# Patient Record
Sex: FEMALE | Race: WHITE | NOT HISPANIC OR LATINO | URBAN - METROPOLITAN AREA
[De-identification: names, ages, dates, MRNs, and addresses within clinical notes are randomized per-mention and may not be internally consistent; named-entity substitution may affect disease eponyms.]

---

## 2020-01-04 ENCOUNTER — INPATIENT (INPATIENT)
Facility: HOSPITAL | Age: 25
LOS: 2 days | Discharge: HOME | DRG: 316 | End: 2020-01-07
Attending: HOSPITALIST | Admitting: HOSPITALIST
Payer: COMMERCIAL

## 2020-01-04 VITALS
RESPIRATION RATE: 17 BRPM | HEART RATE: 98 BPM | HEIGHT: 64 IN | OXYGEN SATURATION: 98 % | DIASTOLIC BLOOD PRESSURE: 69 MMHG | TEMPERATURE: 98 F | SYSTOLIC BLOOD PRESSURE: 100 MMHG | WEIGHT: 134.48 LBS

## 2020-01-04 DIAGNOSIS — J02.0 STREPTOCOCCAL PHARYNGITIS: ICD-10-CM

## 2020-01-04 DIAGNOSIS — F90.9 ATTENTION-DEFICIT HYPERACTIVITY DISORDER, UNSPECIFIED TYPE: ICD-10-CM

## 2020-01-04 DIAGNOSIS — I30.9 ACUTE PERICARDITIS, UNSPECIFIED: ICD-10-CM

## 2020-01-04 DIAGNOSIS — R63.8 OTHER SYMPTOMS AND SIGNS CONCERNING FOOD AND FLUID INTAKE: ICD-10-CM

## 2020-01-04 LAB
ALBUMIN SERPL ELPH-MCNC: 3.8 G/DL — SIGNIFICANT CHANGE UP (ref 3.3–5)
ALP SERPL-CCNC: 71 U/L — SIGNIFICANT CHANGE UP (ref 40–120)
ALT FLD-CCNC: 17 U/L — SIGNIFICANT CHANGE UP (ref 10–45)
ANION GAP SERPL CALC-SCNC: 11 MMOL/L — SIGNIFICANT CHANGE UP (ref 5–17)
AST SERPL-CCNC: 60 U/L — HIGH (ref 10–40)
BASOPHILS # BLD AUTO: 0.04 K/UL — SIGNIFICANT CHANGE UP (ref 0–0.2)
BASOPHILS NFR BLD AUTO: 0.5 % — SIGNIFICANT CHANGE UP (ref 0–2)
BILIRUB SERPL-MCNC: 0.3 MG/DL — SIGNIFICANT CHANGE UP (ref 0.2–1.2)
BUN SERPL-MCNC: 9 MG/DL — SIGNIFICANT CHANGE UP (ref 7–23)
CALCIUM SERPL-MCNC: 8.8 MG/DL — SIGNIFICANT CHANGE UP (ref 8.4–10.5)
CHLORIDE SERPL-SCNC: 103 MMOL/L — SIGNIFICANT CHANGE UP (ref 96–108)
CK MB CFR SERPL CALC: 55.5 NG/ML — HIGH (ref 0–6.7)
CK SERPL-CCNC: 512 U/L — HIGH (ref 25–170)
CO2 SERPL-SCNC: 24 MMOL/L — SIGNIFICANT CHANGE UP (ref 22–31)
CREAT SERPL-MCNC: 0.62 MG/DL — SIGNIFICANT CHANGE UP (ref 0.5–1.3)
D DIMER BLD IA.RAPID-MCNC: 280 NG/ML DDU — HIGH
EOSINOPHIL # BLD AUTO: 0.08 K/UL — SIGNIFICANT CHANGE UP (ref 0–0.5)
EOSINOPHIL NFR BLD AUTO: 0.9 % — SIGNIFICANT CHANGE UP (ref 0–6)
FLU A RESULT: SIGNIFICANT CHANGE UP
FLU A RESULT: SIGNIFICANT CHANGE UP
FLUAV AG NPH QL: SIGNIFICANT CHANGE UP
FLUBV AG NPH QL: SIGNIFICANT CHANGE UP
GLUCOSE SERPL-MCNC: 165 MG/DL — HIGH (ref 70–99)
HCT VFR BLD CALC: 38.6 % — SIGNIFICANT CHANGE UP (ref 34.5–45)
HGB BLD-MCNC: 13.1 G/DL — SIGNIFICANT CHANGE UP (ref 11.5–15.5)
HIV 1+2 AB+HIV1 P24 AG SERPL QL IA: SIGNIFICANT CHANGE UP
IMM GRANULOCYTES NFR BLD AUTO: 0.2 % — SIGNIFICANT CHANGE UP (ref 0–1.5)
LYMPHOCYTES # BLD AUTO: 1.29 K/UL — SIGNIFICANT CHANGE UP (ref 1–3.3)
LYMPHOCYTES # BLD AUTO: 14.6 % — SIGNIFICANT CHANGE UP (ref 13–44)
MCHC RBC-ENTMCNC: 29.8 PG — SIGNIFICANT CHANGE UP (ref 27–34)
MCHC RBC-ENTMCNC: 33.9 GM/DL — SIGNIFICANT CHANGE UP (ref 32–36)
MCV RBC AUTO: 87.7 FL — SIGNIFICANT CHANGE UP (ref 80–100)
MONOCYTES # BLD AUTO: 0.39 K/UL — SIGNIFICANT CHANGE UP (ref 0–0.9)
MONOCYTES NFR BLD AUTO: 4.4 % — SIGNIFICANT CHANGE UP (ref 2–14)
NEUTROPHILS # BLD AUTO: 7.02 K/UL — SIGNIFICANT CHANGE UP (ref 1.8–7.4)
NEUTROPHILS NFR BLD AUTO: 79.4 % — HIGH (ref 43–77)
NRBC # BLD: 0 /100 WBCS — SIGNIFICANT CHANGE UP (ref 0–0)
PCP SPEC-MCNC: SIGNIFICANT CHANGE UP
PLATELET # BLD AUTO: 212 K/UL — SIGNIFICANT CHANGE UP (ref 150–400)
POTASSIUM SERPL-MCNC: 4.1 MMOL/L — SIGNIFICANT CHANGE UP (ref 3.5–5.3)
POTASSIUM SERPL-SCNC: 4.1 MMOL/L — SIGNIFICANT CHANGE UP (ref 3.5–5.3)
PROT SERPL-MCNC: 6.9 G/DL — SIGNIFICANT CHANGE UP (ref 6–8.3)
RBC # BLD: 4.4 M/UL — SIGNIFICANT CHANGE UP (ref 3.8–5.2)
RBC # FLD: 12.9 % — SIGNIFICANT CHANGE UP (ref 10.3–14.5)
RSV RESULT: SIGNIFICANT CHANGE UP
RSV RNA RESP QL NAA+PROBE: SIGNIFICANT CHANGE UP
SODIUM SERPL-SCNC: 138 MMOL/L — SIGNIFICANT CHANGE UP (ref 135–145)
TROPONIN T SERPL-MCNC: 0.73 NG/ML — CRITICAL HIGH (ref 0–0.01)
TROPONIN T SERPL-MCNC: 0.81 NG/ML — CRITICAL HIGH (ref 0–0.01)
TROPONIN T SERPL-MCNC: 0.83 NG/ML — CRITICAL HIGH (ref 0–0.01)
TSH SERPL-MCNC: 2.02 UIU/ML — SIGNIFICANT CHANGE UP (ref 0.35–4.94)
WBC # BLD: 8.84 K/UL — SIGNIFICANT CHANGE UP (ref 3.8–10.5)
WBC # FLD AUTO: 8.84 K/UL — SIGNIFICANT CHANGE UP (ref 3.8–10.5)

## 2020-01-04 PROCEDURE — 71275 CT ANGIOGRAPHY CHEST: CPT | Mod: 26

## 2020-01-04 PROCEDURE — 93306 TTE W/DOPPLER COMPLETE: CPT | Mod: 26

## 2020-01-04 PROCEDURE — 71046 X-RAY EXAM CHEST 2 VIEWS: CPT | Mod: 26

## 2020-01-04 PROCEDURE — 93010 ELECTROCARDIOGRAM REPORT: CPT

## 2020-01-04 PROCEDURE — 99285 EMERGENCY DEPT VISIT HI MDM: CPT

## 2020-01-04 RX ORDER — IBUPROFEN 200 MG
600 TABLET ORAL THREE TIMES A DAY
Refills: 0 | Status: DISCONTINUED | OUTPATIENT
Start: 2020-01-04 | End: 2020-01-07

## 2020-01-04 RX ORDER — COLCHICINE 0.6 MG
0.6 TABLET ORAL ONCE
Refills: 0 | Status: COMPLETED | OUTPATIENT
Start: 2020-01-04 | End: 2020-01-04

## 2020-01-04 RX ORDER — SODIUM CHLORIDE 9 MG/ML
500 INJECTION INTRAMUSCULAR; INTRAVENOUS; SUBCUTANEOUS ONCE
Refills: 0 | Status: COMPLETED | OUTPATIENT
Start: 2020-01-04 | End: 2020-01-04

## 2020-01-04 RX ORDER — ALPRAZOLAM 0.25 MG
0.25 TABLET ORAL ONCE
Refills: 0 | Status: DISCONTINUED | OUTPATIENT
Start: 2020-01-04 | End: 2020-01-04

## 2020-01-04 RX ORDER — AMOXICILLIN 250 MG/5ML
875 SUSPENSION, RECONSTITUTED, ORAL (ML) ORAL
Refills: 0 | Status: DISCONTINUED | OUTPATIENT
Start: 2020-01-04 | End: 2020-01-07

## 2020-01-04 RX ORDER — SODIUM CHLORIDE 9 MG/ML
1000 INJECTION INTRAMUSCULAR; INTRAVENOUS; SUBCUTANEOUS
Refills: 0 | Status: DISCONTINUED | OUTPATIENT
Start: 2020-01-04 | End: 2020-01-07

## 2020-01-04 RX ORDER — PANTOPRAZOLE SODIUM 20 MG/1
40 TABLET, DELAYED RELEASE ORAL ONCE
Refills: 0 | Status: COMPLETED | OUTPATIENT
Start: 2020-01-04 | End: 2020-01-04

## 2020-01-04 RX ORDER — ACETAMINOPHEN 500 MG
650 TABLET ORAL ONCE
Refills: 0 | Status: COMPLETED | OUTPATIENT
Start: 2020-01-04 | End: 2020-01-04

## 2020-01-04 RX ORDER — IPRATROPIUM/ALBUTEROL SULFATE 18-103MCG
3 AEROSOL WITH ADAPTER (GRAM) INHALATION ONCE
Refills: 0 | Status: COMPLETED | OUTPATIENT
Start: 2020-01-04 | End: 2020-01-04

## 2020-01-04 RX ORDER — PANTOPRAZOLE SODIUM 20 MG/1
40 TABLET, DELAYED RELEASE ORAL
Refills: 0 | Status: DISCONTINUED | OUTPATIENT
Start: 2020-01-05 | End: 2020-01-07

## 2020-01-04 RX ORDER — KETOROLAC TROMETHAMINE 30 MG/ML
15 SYRINGE (ML) INJECTION ONCE
Refills: 0 | Status: DISCONTINUED | OUTPATIENT
Start: 2020-01-04 | End: 2020-01-04

## 2020-01-04 RX ADMIN — SODIUM CHLORIDE 50 MILLILITER(S): 9 INJECTION INTRAMUSCULAR; INTRAVENOUS; SUBCUTANEOUS at 21:22

## 2020-01-04 RX ADMIN — Medication 600 MILLIGRAM(S): at 22:24

## 2020-01-04 RX ADMIN — Medication 15 MILLIGRAM(S): at 12:55

## 2020-01-04 RX ADMIN — SODIUM CHLORIDE 1000 MILLILITER(S): 9 INJECTION INTRAMUSCULAR; INTRAVENOUS; SUBCUTANEOUS at 21:24

## 2020-01-04 RX ADMIN — Medication 3 MILLILITER(S): at 08:49

## 2020-01-04 RX ADMIN — Medication 875 MILLIGRAM(S): at 20:24

## 2020-01-04 RX ADMIN — Medication 0.6 MILLIGRAM(S): at 13:17

## 2020-01-04 RX ADMIN — Medication 15 MILLIGRAM(S): at 08:46

## 2020-01-04 RX ADMIN — Medication 0.25 MILLIGRAM(S): at 20:21

## 2020-01-04 RX ADMIN — Medication 0.6 MILLIGRAM(S): at 17:51

## 2020-01-04 RX ADMIN — Medication 600 MILLIGRAM(S): at 23:15

## 2020-01-04 RX ADMIN — PANTOPRAZOLE SODIUM 40 MILLIGRAM(S): 20 TABLET, DELAYED RELEASE ORAL at 13:17

## 2020-01-04 RX ADMIN — Medication 650 MILLIGRAM(S): at 23:37

## 2020-01-04 NOTE — H&P ADULT - NSHPLABSRESULTS_GEN_ALL_CORE
13.1   8.84  )-----------( 212      ( 04 Jan 2020 08:10 )             38.6                              01-04    138  |  103  |  9   ----------------------------<  165<H>  4.1   |  24  |  0.62    Ca    8.8      04 Jan 2020 08:10    TPro  6.9  /  Alb  3.8  /  TBili  0.3  /  DBili  x   /  AST  60<H>  /  ALT  17  /  AlkPhos  71  01-04    LIVER FUNCTIONS - ( 04 Jan 2020 08:10 )  Alb: 3.8 g/dL / Pro: 6.9 g/dL / ALK PHOS: 71 U/L / ALT: 17 U/L / AST: 60 U/L / GGT: x                                   CARDIAC MARKERS ( 04 Jan 2020 11:31 )  x     / 0.81 ng/mL / 512 U/L / x     / 55.5 ng/mL  CARDIAC MARKERS ( 04 Jan 2020 08:10 )  x     / 0.83 ng/mL / 539 U/L / x     / 64.5 ng/mL 13.1   8.84  )-----------( 212      ( 04 Jan 2020 08:10 )             38.6                              01-04    138  |  103  |  9   ----------------------------<  165<H>  4.1   |  24  |  0.62    Ca    8.8      04 Jan 2020 08:10    TPro  6.9  /  Alb  3.8  /  TBili  0.3  /  DBili  x   /  AST  60<H>  /  ALT  17  /  AlkPhos  71  01-04    LIVER FUNCTIONS - ( 04 Jan 2020 08:10 )  Alb: 3.8 g/dL / Pro: 6.9 g/dL / ALK PHOS: 71 U/L / ALT: 17 U/L / AST: 60 U/L / GGT: x                                   CARDIAC MARKERS ( 04 Jan 2020 11:31 )  x     / 0.81 ng/mL / 512 U/L / x     / 55.5 ng/mL  CARDIAC MARKERS ( 04 Jan 2020 08:10 )  x     / 0.83 ng/mL / 539 U/L / x     / 64.5 ng/mL    TTE 1/4/2020  1. The LV wall thickness is borderline. Normal LV size. The estimated LV ejection fraction is 55-60%.   2. The right ventricle is not well visualized. Probably normal size and function.   3. No hemodynamically significant aortic stenosis. No aortic regurgitation noted.   4. Trivial mitral valve thickening. No mitral regurgitation noted.   5. Trace tricuspid regurgitation. Pulmonary artery systolic pressure (estimated using the tricuspid regurgitant gradient and an estimate of right atrial pressure) is 21.00 mmHg.   6. No pulmonic regurgitation noted.   7. The inferior vena cava is normal in size (<2.1cm) with abnormal inspiratory collapse (<50%) consistent with mildly elevated right atrial pressure (  8, range 5-10mmHg).   8. No pericardial effusion is seen. 13.1   8.84  )-----------( 212      ( 04 Jan 2020 08:10 )             38.6                              01-04    138  |  103  |  9   ----------------------------<  165<H>  4.1   |  24  |  0.62    Ca    8.8      04 Jan 2020 08:10    TPro  6.9  /  Alb  3.8  /  TBili  0.3  /  DBili  x   /  AST  60<H>  /  ALT  17  /  AlkPhos  71  01-04    LIVER FUNCTIONS - ( 04 Jan 2020 08:10 )  Alb: 3.8 g/dL / Pro: 6.9 g/dL / ALK PHOS: 71 U/L / ALT: 17 U/L / AST: 60 U/L / GGT: x                                   CARDIAC MARKERS ( 04 Jan 2020 11:31 )  x     / 0.81 ng/mL / 512 U/L / x     / 55.5 ng/mL  CARDIAC MARKERS ( 04 Jan 2020 08:10 )  x     / 0.83 ng/mL / 539 U/L / x     / 64.5 ng/mL      TTE 1/4/2020  1. The LV wall thickness is borderline. Normal LV size. The estimated LV ejection fraction is 55-60%.   2. The right ventricle is not well visualized. Probably normal size and function.   3. No hemodynamically significant aortic stenosis. No aortic regurgitation noted.   4. Trivial mitral valve thickening. No mitral regurgitation noted.   5. Trace tricuspid regurgitation. Pulmonary artery systolic pressure (estimated using the tricuspid regurgitant gradient and an estimate of right atrial pressure) is 21.00 mmHg.   6. No pulmonic regurgitation noted.   7. The inferior vena cava is normal in size (<2.1cm) with abnormal inspiratory collapse (<50%) consistent with mildly elevated right atrial pressure (  8, range 5-10mmHg).   8. No pericardial effusion is seen.

## 2020-01-04 NOTE — ED PROVIDER NOTE - CLINICAL SUMMARY MEDICAL DECISION MAKING FREE TEXT BOX
chest pain and sob with recent dx of strep. pt well appearing. VSS. lungs clear on exam. ecg ? pr depressions. labs noted. troponin elevated and d dimer elevated. cta done and no PE. cardiology consulted, echo done and cardiology recommends colcocine, PPI and admission

## 2020-01-04 NOTE — H&P ADULT - ASSESSMENT
24-year-old female PMHx ADD who presented to [facility] with SOB, chest pressure, and fever x 1 day. EKG showed GABI inferiorly and troponin 0.83-->0.81. CT PE NEG. TTE nml EF 55-60%, no valvular pathology, no pericardial effusion. Treated with 15mg Toradol IV. Treated as pericarditis and given 0.6mg Colchicine. Admitted to Cardiology for further work-up and management of pericarditis. Currently stable. 24-year-old female PMHx ADD with active strep throat who presented to [facility] with SOB, chest pressure, and fever x 1 day. EKG showed GABI inferiorly and troponin 0.83-->0.81. CT PE NEG. TTE nml EF 55-60%, no valvular pathology, no pericardial effusion. Treated with 15mg Toradol IV. Treated as pericarditis and given 0.6mg Colchicine. Admitted to Cardiology for further work-up and management of pericarditis. Currently stable.

## 2020-01-04 NOTE — H&P ADULT - HISTORY OF PRESENT ILLNESS
Ms. Arnold is a very pleasant 24-year-old female PMHx ADD who presented to [facility] with SOB, chest pressure, and fever x 1 day. Briefly, patient just returned from [place] 1/1/20 and was diagnosed with strep throat 1/2 and started Amoxicillin (was only taking daily not BID). She reports fever, fatigue, and sore throat starting Tuesday while in [place]. She flew home 12/30 and was fatigued and dehydrated and drank little fluids on the flight. Yesterday around 7pm she started to feel sharp SSCP with mild SOB. She presented to the ED for further management. In the ED: VS 98.2F, 100/70mmHG, 80 bpm, RR 18, 985 RA. EKG showed GABI inferiorly and troponin 0.83-->0.81. CT PE NEG. TTE nml EF 55-60%, no valvular pathology, no pericardial effusion. Treated with 15mg Toradol IV. Treated as pericarditis and given 0.6mg Colchicine. Admitted to Cardiology for further work-up and management.     Patient currently resting comfortably and VSS. Reporting some mild SOB with lots of talking. She denies any childhood illnesses with up-to-date vaccines. She denies trauma to chest/autoimmune or connective tissue disease. She denies hx of blood clots/cancer. Ms. Arnold is a very pleasant 24-year-old female PMHx ADD who presented to [facility] with SOB, chest pressure, and fever x 1 day. Briefly, patient just returned from [place] 1/1/20 and was diagnosed with strep throat 1/2 and started Amoxicillin (was only taking daily not BID). She reports fever, fatigue, and sore throat starting Tuesday while in [place]. She flew home 12/30 and was fatigued and dehydrated and drank little fluids on the flight. Yesterday around 7pm she started to feel sharp SSCP with mild SOB. She presented to the ED for further management. In the ED: VS 98.2F, 100/70mmHG, 80 bpm, RR 18, 985 RA. EKG showed GABI inferiorly and troponin 0.83-->0.81. CT PE NEG. TTE nml EF 55-60%, no valvular pathology, no pericardial effusion. Treated with 15mg Toradol IV. Treated as pericarditis and given 0.6mg Colchicine. Admitted to Cardiology for further work-up and management.     Patient currently resting comfortably and VSS. Reporting some mild SOB with lots of talking. She denies any childhood illnesses with up-to-date vaccines. She denies trauma to chest/autoimmune or connective tissue disease. She denies hx of blood clots/cancer. No recent surgeries. Ms. Arnold is a very pleasant 24-year-old female PMHx ADD who presented to [facility] with SOB, chest pressure, and fever x 1 day. Briefly, patient just returned from [place] 1/1/20 and was diagnosed with strep throat 1/2 and started Amoxicillin (was only taking daily not BID). She reports fever, fatigue, and sore throat starting Tuesday while in [place]. She flew home 12/30 and was fatigued and dehydrated and drank little fluids on the flight. Yesterday around 7pm she started to feel sharp SSCP with mild SOB. She presented to the ED for further management. In the ED: VS 98.2F, 100/70mmHG, 80 bpm, RR 18, 985 RA. EKG showed GABI inferiorly and troponin 0.83-->0.81. CT PE NEG. TSH normal. TTE nml EF 55-60%, no valvular pathology, no pericardial effusion. Treated with 15mg Toradol IV. Treated as pericarditis and given 0.6mg Colchicine. Admitted to Cardiology for further work-up and management.     Patient currently resting comfortably and VSS. Reporting some mild SOB with lots of talking. She denies any childhood illnesses with up-to-date vaccines. She denies trauma to chest/autoimmune or connective tissue disease. She denies hx of blood clots/cancer. No recent surgeries.

## 2020-01-04 NOTE — ED ADULT NURSE REASSESSMENT NOTE - NS ED NURSE REASSESS COMMENT FT1
Patient received at change of shift resting in stretcher, no acute distress.  Respirations unlabored, non-diaphoretic, no pallor.  Pending xray, lab results.  will monitor.

## 2020-01-04 NOTE — H&P ADULT - NSHPSOCIALHISTORY_GEN_ALL_CORE
Denies tobacco/vaping  Denies illicit drug use  Reports 5-10 drinks per night on weekends.  Exercises regularly

## 2020-01-04 NOTE — H&P ADULT - PROBLEM SELECTOR PLAN 1
Presented with sharp CP, SOB. EKG with inf GABI, troponin 0.83-->0.81. Current viral strep throat. Admitted for pericarditis. TTE 55-60%, no valvular pathology, no pericardial effusion. Treating with supportive care.   -trend troponin 18:00 tonight and again with AM labs, currently trending down.  -Ibuprofen 600mg TID  -Colchicine 0.6 mg daily   -AM EKG  -Cardiac MRI ordered (may be done as outpatient pending clinical picture Sunday)  -CTA-coronaries (may be done as outpatient pending clinical picture Sunday)  -Oxygen as needed by NC for comfort in acute phase.  -PPI QAM with NSAIDs Presented with sharp CP, SOB. EKG with inf GABI, troponin 0.83-->0.81. Current bacterial strep throat. Admitted for pericarditis. TTE 55-60%, no valvular pathology, no pericardial effusion. Treating with supportive care.   -trend troponin 18:00 tonight and again with AM labs, currently trending down.  -Ibuprofen 600mg TID  -Colchicine 0.6 mg daily   -AM EKG  -Cardiac MRI ordered (may be done as outpatient pending clinical picture Sunday)  -CTA-coronaries (may be done as outpatient pending clinical picture Sunday)  -Oxygen as needed by NC for comfort in acute phase.  -PPI QAM with NSAIDs Presented with sharp CP, SOB. EKG with inf GABI, troponin 0.83-->0.81. Current bacterial strep throat. Admitted for pericarditis. TTE 55-60%, no valvular pathology, no pericardial effusion. TSH normal. Treating with supportive care.   -trend troponin 18:00 tonight and again with AM labs, currently trending down.  -Ibuprofen 600mg TID  -Colchicine 0.6 mg daily   -AM EKG  -Cardiac MRI ordered (may be done as outpatient pending clinical picture Sunday)  -CTA-coronaries (may be done as outpatient pending clinical picture Sunday)  -Oxygen as needed by NC for comfort in acute phase.  -PPI QAM with NSAIDs

## 2020-01-04 NOTE — ED PROVIDER NOTE - ENMT, MLM
Airway patent, Nasal mucosa clear. Mouth with normal mucosa. Throat has no vesicles, no oropharyngeal exudates and uvula is midline. 2 + tonsils b/l without exudate.

## 2020-01-04 NOTE — ED ADULT NURSE NOTE - OBJECTIVE STATEMENT
sudden  chest discomfort started since yesterday, pt was recently Dx of strep infections on  antibiotics started Thursday of this week.

## 2020-01-04 NOTE — H&P ADULT - PROBLEM SELECTOR PLAN 4
F: IVF if unable to tolerate PO  E: Keep K>4, Mag>2  N: Regular diet    PT: no evaluation needed  GI: PPI Q am given Rx NSAIDs  DVT: ambulatory    Plan discussed with patient, family, and Dr. Moore. All questions answered.

## 2020-01-04 NOTE — ED PROVIDER NOTE - ATTENDING CONTRIBUTION TO CARE
23 yo female h/o ADD recent diagnosis of strep c/o 1 d chest tightness and sob.  + travel to [place] recently  Pt on amox w some improvement in throat sx.  No cough  No le pain/swelling.  No h/o similar, h/o or fh pe, cad.  Well appearing, nad, nc/at, lung cta, heart reg, abd soft, nt, ext no gross deformity, no c/c/e/ttp bilat le,  no gross neuro deficits  EKG w/o stemi but ? pr depression vs j pt elevation.  ? pe, uri related rad, doubt acs but ? pericarditis. Plan labs, cxr, consider cta if abnl ddimer, nsaid, neb; reassess.

## 2020-01-04 NOTE — ED PROVIDER NOTE - OBJECTIVE STATEMENT
23 y/o female with hx of ADD c/o chest pressure x 1 day. pt states pressure to center of chest and intermittent sob since yesterday. pt notes dx with strep throat recently and on amoxicillin. + subj fever. no myalgias. no chills. pt reports throat pain improving. no ha or dizziness. no n/v/d. no leg pain or swelling. pt reports recent trip to [place]. no further complaints.

## 2020-01-04 NOTE — PATIENT PROFILE ADULT - NSPROEDALEARNPREF_GEN_A_NUR
computer/internet/pictorial/skill demonstration/audio/video/written material/verbal instruction/group instruction/individual instruction

## 2020-01-04 NOTE — H&P ADULT - NSHPPHYSICALEXAM_GEN_ALL_CORE
Awake, talkative, in NAD  AXOX3, follows commands, appropriate  Neck supple, no JVD noted  PEERL, nasal/buccal mucosa moist and well perfused  BS clear bilaterally, unlabored, symmetrical  S1/S2, no S3, RRR, no M/G/R noted  Abdomen soft, non tender, non distended  No edema noted, perfusion brisk  Pulses palpable throughout  Skin warm and dry, no rashes/lesions noted Awake, talkative, in NAD  AXOX3, follows commands, appropriate  Neck supple, no JVD noted  PEERL, nasal/buccal mucosa moist and well perfused  BS clear bilaterally, unlabored, symmetrical  S1/S2, no S3, no M/G/R noted  Abdomen soft, non tender, non distended  No edema noted, perfusion brisk  Pulses palpable throughout  Skin warm and dry, no rashes/lesions noted

## 2020-01-05 LAB
ANION GAP SERPL CALC-SCNC: 10 MMOL/L — SIGNIFICANT CHANGE UP (ref 5–17)
BUN SERPL-MCNC: 5 MG/DL — LOW (ref 7–23)
CALCIUM SERPL-MCNC: 8.9 MG/DL — SIGNIFICANT CHANGE UP (ref 8.4–10.5)
CHLORIDE SERPL-SCNC: 107 MMOL/L — SIGNIFICANT CHANGE UP (ref 96–108)
CHOLEST SERPL-MCNC: 126 MG/DL — SIGNIFICANT CHANGE UP (ref 10–199)
CO2 SERPL-SCNC: 21 MMOL/L — LOW (ref 22–31)
CREAT SERPL-MCNC: 0.54 MG/DL — SIGNIFICANT CHANGE UP (ref 0.5–1.3)
ERYTHROCYTE [SEDIMENTATION RATE] IN BLOOD: 50 MM/HR — HIGH
GLUCOSE SERPL-MCNC: 123 MG/DL — HIGH (ref 70–99)
HBA1C BLD-MCNC: 5.1 % — SIGNIFICANT CHANGE UP (ref 4–5.6)
HCT VFR BLD CALC: 38.5 % — SIGNIFICANT CHANGE UP (ref 34.5–45)
HDLC SERPL-MCNC: 39 MG/DL — LOW
HGB BLD-MCNC: 13.1 G/DL — SIGNIFICANT CHANGE UP (ref 11.5–15.5)
LIPID PNL WITH DIRECT LDL SERPL: 73 MG/DL — SIGNIFICANT CHANGE UP
MCHC RBC-ENTMCNC: 29.2 PG — SIGNIFICANT CHANGE UP (ref 27–34)
MCHC RBC-ENTMCNC: 34 GM/DL — SIGNIFICANT CHANGE UP (ref 32–36)
MCV RBC AUTO: 85.9 FL — SIGNIFICANT CHANGE UP (ref 80–100)
NRBC # BLD: 0 /100 WBCS — SIGNIFICANT CHANGE UP (ref 0–0)
PLATELET # BLD AUTO: 228 K/UL — SIGNIFICANT CHANGE UP (ref 150–400)
POTASSIUM SERPL-MCNC: 3.8 MMOL/L — SIGNIFICANT CHANGE UP (ref 3.5–5.3)
POTASSIUM SERPL-SCNC: 3.8 MMOL/L — SIGNIFICANT CHANGE UP (ref 3.5–5.3)
RBC # BLD: 4.48 M/UL — SIGNIFICANT CHANGE UP (ref 3.8–5.2)
RBC # FLD: 12.5 % — SIGNIFICANT CHANGE UP (ref 10.3–14.5)
SODIUM SERPL-SCNC: 138 MMOL/L — SIGNIFICANT CHANGE UP (ref 135–145)
TOTAL CHOLESTEROL/HDL RATIO MEASUREMENT: 3.2 RATIO — LOW (ref 3.3–7.1)
TRIGL SERPL-MCNC: 70 MG/DL — SIGNIFICANT CHANGE UP (ref 10–149)
TROPONIN T SERPL-MCNC: 1.17 NG/ML — CRITICAL HIGH (ref 0–0.01)
TROPONIN T SERPL-MCNC: 1.21 NG/ML — CRITICAL HIGH (ref 0–0.01)
WBC # BLD: 8.52 K/UL — SIGNIFICANT CHANGE UP (ref 3.8–10.5)
WBC # FLD AUTO: 8.52 K/UL — SIGNIFICANT CHANGE UP (ref 3.8–10.5)

## 2020-01-05 PROCEDURE — 99222 1ST HOSP IP/OBS MODERATE 55: CPT

## 2020-01-05 RX ORDER — METOPROLOL TARTRATE 50 MG
25 TABLET ORAL EVERY 12 HOURS
Refills: 0 | Status: DISCONTINUED | OUTPATIENT
Start: 2020-01-05 | End: 2020-01-07

## 2020-01-05 RX ORDER — COLCHICINE 0.6 MG
0.6 TABLET ORAL EVERY 12 HOURS
Refills: 0 | Status: DISCONTINUED | OUTPATIENT
Start: 2020-01-05 | End: 2020-01-07

## 2020-01-05 RX ORDER — PANTOPRAZOLE SODIUM 20 MG/1
1 TABLET, DELAYED RELEASE ORAL
Qty: 90 | Refills: 0
Start: 2020-01-05 | End: 2020-04-03

## 2020-01-05 RX ORDER — COLCHICINE 0.6 MG
1 TABLET ORAL
Qty: 180 | Refills: 0
Start: 2020-01-05 | End: 2020-04-03

## 2020-01-05 RX ORDER — IBUPROFEN 200 MG
1 TABLET ORAL
Qty: 21 | Refills: 0
Start: 2020-01-05 | End: 2020-01-11

## 2020-01-05 RX ORDER — ACETAMINOPHEN 500 MG
650 TABLET ORAL EVERY 6 HOURS
Refills: 0 | Status: DISCONTINUED | OUTPATIENT
Start: 2020-01-05 | End: 2020-01-07

## 2020-01-05 RX ADMIN — Medication 600 MILLIGRAM(S): at 09:28

## 2020-01-05 RX ADMIN — PANTOPRAZOLE SODIUM 40 MILLIGRAM(S): 20 TABLET, DELAYED RELEASE ORAL at 06:37

## 2020-01-05 RX ADMIN — Medication 0.6 MILLIGRAM(S): at 18:38

## 2020-01-05 RX ADMIN — Medication 600 MILLIGRAM(S): at 15:17

## 2020-01-05 RX ADMIN — Medication 25 MILLIGRAM(S): at 18:38

## 2020-01-05 RX ADMIN — Medication 600 MILLIGRAM(S): at 10:54

## 2020-01-05 RX ADMIN — Medication 600 MILLIGRAM(S): at 14:03

## 2020-01-05 RX ADMIN — Medication 600 MILLIGRAM(S): at 20:21

## 2020-01-05 RX ADMIN — Medication 600 MILLIGRAM(S): at 21:00

## 2020-01-05 RX ADMIN — Medication 875 MILLIGRAM(S): at 06:39

## 2020-01-05 RX ADMIN — Medication 650 MILLIGRAM(S): at 00:30

## 2020-01-05 RX ADMIN — Medication 650 MILLIGRAM(S): at 22:25

## 2020-01-05 RX ADMIN — Medication 0.6 MILLIGRAM(S): at 06:37

## 2020-01-05 RX ADMIN — Medication 875 MILLIGRAM(S): at 18:36

## 2020-01-05 RX ADMIN — Medication 650 MILLIGRAM(S): at 21:26

## 2020-01-05 NOTE — DISCHARGE NOTE PROVIDER - NSDCMRMEDTOKEN_GEN_ALL_CORE_FT
amoxicillin 875 mg oral tablet: 1 tab(s) orally every 12 hours  Kurvelo 0.15 mg-30 mcg oral tablet: 1 tab(s) orally once a day  Vyvanse 30 mg oral capsule: 1 cap(s) orally once a day (in the morning) amoxicillin 875 mg oral tablet: 1 tab(s) orally every 12 hours  colchicine 0.6 mg oral tablet: 1 tab(s) orally every 12 hours  ibuprofen 600 mg oral tablet: 1 tab(s) orally 3 times a day  Kurvelo 0.15 mg-30 mcg oral tablet: 1 tab(s) orally once a day  Protonix 40 mg oral delayed release tablet: 1 tab(s) orally once a day  Vyvanse 30 mg oral capsule: 1 cap(s) orally once a day (in the morning) amoxicillin 875 mg oral tablet: 1 tab(s) orally every 12 hours, Completes last dose on Monday 1/13  colchicine 0.6 mg oral tablet: 1 tab(s) orally every 12 hours  ibuprofen 600 mg oral tablet: 1 tab(s) orally 3 times a day  Kurvelo 0.15 mg-30 mcg oral tablet: 1 tab(s) orally once a day  Protonix 40 mg oral delayed release tablet: 1 tab(s) orally once a day  Vyvanse 30 mg oral capsule: 1 cap(s) orally once a day (in the morning). May discuss resuming after following up with cardiologist.

## 2020-01-05 NOTE — DISCHARGE NOTE PROVIDER - NSDCCPCAREPLAN_GEN_ALL_CORE_FT
PRINCIPAL DISCHARGE DIAGNOSIS  Diagnosis: Pericarditis  Assessment and Plan of Treatment: You were admitted due to chest pain from a condition called pericarditis. You were started on high dose non-steroidal anti-inflammatory medications Ibuprofen and Colchicine. You should continue Ibuprofen 600mg every 8h for 1 more week, then 400mg every 8h for 1 week, then 200mg every 8h for 1 week then stop. You should also continue Colchicine 0.6mg every 12h for 3 months. While on these medications you should continue a medication called pantoprazole to help prevent GI upset.   Please call and schedule an appointment with Dr. Miller on Monday. PRINCIPAL DISCHARGE DIAGNOSIS  Diagnosis: Myocarditis  Assessment and Plan of Treatment: You were admitted due to chest pain from a condition called myocarditis. You were started on high dose non-steroidal anti-inflammatory medications Ibuprofen and Colchicine. You should continue Ibuprofen 600mg every 8h for 1 more week, then 400mg every 8h for 1 week, then 200mg every 8h for 1 week then stop. You should also continue Colchicine 0.6mg every 12h for 3 months. While on these medications you should continue a medication called pantoprazole to help prevent GI upset. PRINCIPAL DISCHARGE DIAGNOSIS  Diagnosis: Viral myocarditis  Assessment and Plan of Treatment: You came into the hospital for chest pain and was diagnosed with viral myocarditis. You had a cardiac MRI that showed inflammatory changes to your heart, but your Ejection Fraction remained normal. Please continue treatment of high dose Ibuprofen and Colchicine to help treat this. Take Ibuprofen 600mg every 8hrs for 1 week total (last dose on Thursday evening 1/9/2020. Take Colchicine 0.6mg twice a day for 2 months total. Take Protonix while taking high dose Ibuprofen to prevent against gastric ulcers or bleeding. Follow up with your PMD /cardiologist Dr Javed in 1-2 weeks.      SECONDARY DISCHARGE DIAGNOSES  Diagnosis: Strep throat  Assessment and Plan of Treatment: Please continue taking antibiotic amoxicillin twice a day for 10 days course. Your last dose of antibiotic will be on Monday evening 1/13/2020.

## 2020-01-05 NOTE — PROGRESS NOTE ADULT - ASSESSMENT
24-year-old female PMHx ADD with active strep throat who presented to [facility] with SOB, chest pressure, and fever x 1 day. EKG showed GABI inferiorly and troponin 0.83-->0.81. CT PE NEG. TTE nml EF 55-60%, no valvular pathology, no pericardial effusion. Treated with 15mg Toradol IV. Treated as pericarditis and given 0.6mg Colchicine. Admitted to Cardiology for further work-up and management of pericarditis. Currently stable.

## 2020-01-05 NOTE — PROGRESS NOTE ADULT - SUBJECTIVE AND OBJECTIVE BOX
O/N Events: Patient complaining of Chest Pain overnight 2/2 Pericarditis  Subjective/ROS: Patient without complaints. Denies HA, CP, SOB, n/v, changes in bowel/urinary habits.  12pt ROS otherwise negative.    VITALS  Vital Signs Last 24 Hrs  T(C): 37.2 (05 Jan 2020 10:00), Max: 37.9 (04 Jan 2020 14:26)  T(F): 99 (05 Jan 2020 10:00), Max: 100.2 (04 Jan 2020 14:26)  HR: 98 (05 Jan 2020 08:24) (86 - 106)  BP: 103/69 (05 Jan 2020 08:24) (97/68 - 140/83)  BP(mean): 78 (05 Jan 2020 08:24) (78 - 110)  RR: 16 (05 Jan 2020 08:24) (14 - 18)  SpO2: 99% (05 Jan 2020 08:24) (98% - 100%)    CAPILLARY BLOOD GLUCOSE    PHYSICAL EXAM  General: A&Ox3; NAD  Head: NC/AT; MMM; PERRL; EOMI;  Neck: Supple; no JVD  Respiratory: CTA B/L; no wheezes/crackles   Cardiovascular: Regular rhythm/rate; S1/S2   Gastrointestinal: Soft; NTND; normoactive BS  Extremities: WWP; no edema/cyanosis  Neurological:  CNII-XII grossly intact; no obvious focal deficits    MEDICATIONS  (STANDING):  amoxicillin 875 milliGRAM(s) Oral two times a day  colchicine 0.6 milliGRAM(s) Oral every 12 hours  ibuprofen  Tablet. 600 milliGRAM(s) Oral three times a day  LORazepam     Tablet 0.25 milliGRAM(s) Oral once  pantoprazole    Tablet 40 milliGRAM(s) Oral before breakfast  sodium chloride 0.9%. 1000 milliLiter(s) (50 mL/Hr) IV Continuous <Continuous>    MEDICATIONS  (PRN):      No Known Allergies      LABS                        13.1   8.52  )-----------( 228      ( 05 Jan 2020 12:25 )             38.5     01-05    138  |  107  |  5<L>  ----------------------------<  123<H>  3.8   |  21<L>  |  0.54    Ca    8.9      05 Jan 2020 12:25    TPro  6.9  /  Alb  3.8  /  TBili  0.3  /  DBili  x   /  AST  60<H>  /  ALT  17  /  AlkPhos  71  01-04        CARDIAC MARKERS ( 05 Jan 2020 12:25 )  x     / 1.17 ng/mL / x     / x     / x      CARDIAC MARKERS ( 04 Jan 2020 18:40 )  x     / 0.73 ng/mL / 312 U/L / x     / 28.9 ng/mL  CARDIAC MARKERS ( 04 Jan 2020 11:31 )  x     / 0.81 ng/mL / 512 U/L / x     / 55.5 ng/mL  CARDIAC MARKERS ( 04 Jan 2020 08:10 )  x     / 0.83 ng/mL / 539 U/L / x     / 64.5 ng/mL

## 2020-01-05 NOTE — PROGRESS NOTE ADULT - PROBLEM SELECTOR PLAN 1
Presented with sharp CP, SOB. EKG with inf GABI, troponin 0.83-->0.81. Current bacterial strep throat. Admitted for pericarditis. TTE 55-60%, no valvular pathology, no pericardial effusion. TSH normal. Treating with supportive care.   -trend troponin 18:00 tonight elevated today at noon  -Ibuprofen 600mg TID  -Colchicine 0.6 mg daily   -AM EKG  -Cardiac MRI ordered   -CTA-coronaries   -Oxygen as needed by NC for comfort in acute phase.  -PPI QAM with NSAIDs

## 2020-01-05 NOTE — DISCHARGE NOTE PROVIDER - HOSPITAL COURSE
24-year-old female PMHx ADD with active strep throat who presented to [facility] with SOB, chest pressure, and fever x 1 day. EKG showed GABI inferiorly and troponin 0.83-->0.81. CT PE NEG. TTE nml EF 55-60%, no valvular pathology, no pericardial effusion. Treated with 15mg Toradol IV. Treated as pericarditis and given 0.6mg Colchicine. Admitted to Cardiology for treatment of pericarditis. Troponin trended down and patient was without pain. Received Benzodiazepine x2 for anxiety. Plans were made to discharge patient to home and follow up with outpatient cardiologist. 24-year-old female PMHx ADD with active strep throat who presented to [facility] with SOB, chest pressure, and fever x 1 day. EKG showed GABI inferiorly and troponin 0.83-->0.81. CT PE NEG. TTE nml EF 55-60%, no valvular pathology, no pericardial effusion. Treated with 15mg Toradol IV. Admitted to Cardiology [facility] for treatment of myocarditis. Troponin uptrended and peaked at 1.47. CTA Cardiac w/ normal coronaries, zero calcium score. Cardiac MRI showed EF 60%, there is subepicardial delayed enhancement in the basal to mid inferior and inferolateral walls. There is also patchy subepicardial delayed enhancement in the apical inferior wall. This pattern of enhancement is non-ischemic; Mild mitral and tricuspid regurgitation; Small  pericardial effusion. She was              Patient was without further chest pain. Received Benzodiazepine x2 for anxiety. Plans were made to discharge patient to home and follow up with outpatient cardiologist. 24-year-old female PMHx ADD with active strep throat (on Amoxicillin but taking QD instead of the prescribed BID) who presented to [facility] with SOB, chest pressure, and fever x 1 day. EKG showed GABI inferiorly and troponin 0.83-->0.81. CT PE NEG. TTE nml EF 55-60%, no valvular pathology, no pericardial effusion. Treated with 15mg Toradol IV. Admitted to Cardiology 5 [facility] for treatment of Strep associated myocarditis. Troponin uptrended and peaked at 1.47. CTA Cardiac w/ normal coronaries, zero calcium score. Cardiac MRI showed EF 60%, there is subepicardial delayed enhancement in the basal to mid inferior and inferolateral walls. There is also patchy subepicardial delayed enhancement in the apical inferior wall. This pattern of enhancement is non-ischemic; Mild mitral and tricuspid regurgitation; Small pericardial effusion. Her chest pain resolved with initiation of treatment of Colchicine 0.6mg BID x 2 months and high dose Ibuprofen 600mg TID for 1 week. She will follow up with her outpatient PMD/ cardiologist Dr Varghese Javed in 1-2 weeks.

## 2020-01-05 NOTE — DISCHARGE NOTE PROVIDER - CARE PROVIDER_API CALL
Domonique Miller)  Cardiovascular Disease; Internal Medicine  [address] [place] [address]  Phone: [phone]  Fax: [phone]  Follow Up Time: Johanne Barnett  [address] [address]  Phone: [phone]  Fax: (   )    -  Follow Up Time: Varghese Javed  [address] [place], [address]  Phone: [phone]  Fax: (   )    -  Established Patient  Follow Up Time: 2 weeks

## 2020-01-05 NOTE — DISCHARGE NOTE PROVIDER - NSDCFUADDAPPT_GEN_ALL_CORE_FT
Discharged You will need to call and schedule a follow up appointment with Dr. Miller when the office is open on Monday. 1. Follow up with cardiologist Dr Barnett in 2 weeks 1. Follow up with your cardiologist Dr Javed in 2 weeks. Please call the office to make an appointment as the office was closed today.

## 2020-01-05 NOTE — DISCHARGE NOTE PROVIDER - PROVIDER TOKENS
PROVIDER:[TOKEN:[[id number]]] FREE:[LAST:[Anibal],FIRST:[Johanne],PHONE:[phone]],FAX:[(   )    -],ADDRESS:[[address] [place] [address]]] FREE:[LAST:[Tegan],FIRST:[Varghese],PHONE:[phone]:[(   )    -],ADDRESS:[[address] [place] [address], [address]],FOLLOWUP:[2 weeks],ESTABLISHEDPATIENT:[T]]

## 2020-01-05 NOTE — DISCHARGE NOTE PROVIDER - CARE PROVIDERS DIRECT ADDRESSES
[email or web address] ,DirectAddress_Unknown

## 2020-01-06 LAB
DSDNA AB SER-ACNC: <12 IU/ML — SIGNIFICANT CHANGE UP
TROPONIN T SERPL-MCNC: 1.43 NG/ML — CRITICAL HIGH (ref 0–0.01)

## 2020-01-06 PROCEDURE — 75574 CT ANGIO HRT W/3D IMAGE: CPT | Mod: 26

## 2020-01-06 PROCEDURE — 75561 CARDIAC MRI FOR MORPH W/DYE: CPT | Mod: 26

## 2020-01-06 PROCEDURE — 99233 SBSQ HOSP IP/OBS HIGH 50: CPT

## 2020-01-06 RX ADMIN — Medication 600 MILLIGRAM(S): at 10:04

## 2020-01-06 RX ADMIN — Medication 600 MILLIGRAM(S): at 16:01

## 2020-01-06 RX ADMIN — Medication 875 MILLIGRAM(S): at 19:32

## 2020-01-06 RX ADMIN — Medication 600 MILLIGRAM(S): at 15:03

## 2020-01-06 RX ADMIN — Medication 600 MILLIGRAM(S): at 11:00

## 2020-01-06 RX ADMIN — Medication 0.6 MILLIGRAM(S): at 06:39

## 2020-01-06 RX ADMIN — Medication 0.6 MILLIGRAM(S): at 17:18

## 2020-01-06 RX ADMIN — Medication 875 MILLIGRAM(S): at 07:01

## 2020-01-06 RX ADMIN — Medication 600 MILLIGRAM(S): at 22:59

## 2020-01-06 RX ADMIN — Medication 25 MILLIGRAM(S): at 17:18

## 2020-01-06 RX ADMIN — PANTOPRAZOLE SODIUM 40 MILLIGRAM(S): 20 TABLET, DELAYED RELEASE ORAL at 06:39

## 2020-01-06 RX ADMIN — Medication 25 MILLIGRAM(S): at 06:39

## 2020-01-06 RX ADMIN — Medication 600 MILLIGRAM(S): at 23:31

## 2020-01-06 NOTE — PROGRESS NOTE ADULT - PROBLEM SELECTOR PLAN 2
Recent diagnosis. On day 5/10 abx  -continue Amoxicillin 875mg PO BID  -Trend fever curve  -Blood cx's NGTD

## 2020-01-06 NOTE — PROGRESS NOTE ADULT - SUBJECTIVE AND OBJECTIVE BOX
CARDIOLOGY NP PROGRESS NOTE    Subjective: Patient seen and examined by me at bedside. Denies complaints. Anxious about upcoming diagnostic studies. Remainder ROS otherwise negative.    Overnight Events: No acute events overnight. Blood cx's NEG. Started BB with adequate rate control. No further fevers. Troponin elevation, no EKG changes or Q waves noted.     TELEMETRY: SR 60's-80's.    VITAL SIGNS:  T(C): 37.3 (01-06-20 @ 09:35), Max: 37.3 (01-05-20 @ 14:14)  HR: 76 (01-06-20 @ 08:30) (76 - 88)  BP: 101/65 (01-06-20 @ 08:30) (98/72 - 106/77)  RR: 16 (01-06-20 @ 08:30) (16 - 18)  SpO2: 96% (01-06-20 @ 08:30) (96% - 100%)    PHYSICAL EXAM:  Awake, talkative, in NAD  AXOX3, follows commands, appropriate  Neck supple, no JVD noted  PEERL, nasal/buccal mucosa moist and well perfused  BS clear bilaterally, unlabored, symmetrical  S1/S2, no S3, RRR, no M/G/R noted  Abdomen soft, non tender, non distended  No edema noted, perfusion brisk  Pulses palpable throughout  Skin warm and dry, no rashes/lesions noted    LABS:             13.1   8.52  )-----------( 228      ( 05 Jan 2020 12:25 )             38.5                              01-05    138  |  107  |  5<L>  ----------------------------<  123<H>  3.8   |  21<L>  |  0.54    Ca    8.9      05 Jan 2020 12:25          CARDIAC MARKERS ( 06 Jan 2020 06:20 )  x     / 1.43 ng/mL / x     / x     / x      CARDIAC MARKERS ( 05 Jan 2020 20:06 )  x     / 1.21 ng/mL / x     / x     / x      CARDIAC MARKERS ( 05 Jan 2020 12:25 )  x     / 1.17 ng/mL / x     / x     / x      CARDIAC MARKERS ( 04 Jan 2020 18:40 )  x     / 0.73 ng/mL / 312 U/L / x     / 28.9 ng/mL  CARDIAC MARKERS ( 04 Jan 2020 11:31 )  x     / 0.81 ng/mL / 512 U/L / x     / 55.5 ng/mL    Allergies:  No Known Allergies    MEDICATIONS  (STANDING):  amoxicillin 875 milliGRAM(s) Oral two times a day  colchicine 0.6 milliGRAM(s) Oral every 12 hours  ibuprofen  Tablet. 600 milliGRAM(s) Oral three times a day  LORazepam     Tablet 0.25 milliGRAM(s) Oral once  metoprolol tartrate 25 milliGRAM(s) Oral every 12 hours  pantoprazole    Tablet 40 milliGRAM(s) Oral before breakfast  sodium chloride 0.9%. 1000 milliLiter(s) (50 mL/Hr) IV Continuous <Continuous>    MEDICATIONS  (PRN):  acetaminophen   Tablet .. 650 milliGRAM(s) Oral every 6 hours PRN Moderate Pain (4 - 6)    DIAGNOSTIC TESTS:   TTE 1/4  CONCLUSIONS:     1. The LV wall thickness is borderline. Normal LV size. The estimated LV ejection fraction is 55-60%.   2. The right ventricle is not well visualized. Probably normal size and function.   3. No hemodynamically significant aortic stenosis. No aortic regurgitation noted.   4. Trivial mitral valve thickening. No mitral regurgitation noted.   5. Trace tricuspid regurgitation. Pulmonary artery systolic pressure (estimated using the tricuspid regurgitant gradient and an estimate of right atrial pressure) is 21.00 mmHg.   6. No pulmonic regurgitation noted.   7. The inferior vena cava is normal in size (<2.1cm) with abnormal inspiratory collapse (<50%) consistent with mildly elevated right atrial pressure (  8, range 5-10mmHg).   8. No pericardial effusion is seen.

## 2020-01-06 NOTE — PROGRESS NOTE ADULT - PROBLEM SELECTOR PLAN 4
F: IVF if unable to tolerate PO  E: Keep K>4, Mag>2  N: Regular diet    PT: no evaluation needed  GI: PPI Q am given Rx NSAIDs  DVT: ambulatory    Plan discussed with patient, family, and 5L attending. All questions answered.

## 2020-01-06 NOTE — PROGRESS NOTE ADULT - ASSESSMENT
24-year-old female PMHx ADD with active strep throat who presented to [facility] with SOB, chest pressure, and fever x 1 day. EKG showed GABI inferiorly and troponin 0.83-->0.81. CT PE NEG. TTE nml EF 55-60%, no valvular pathology, no pericardial effusion. Treated with 15mg Toradol IV. Treated as pericarditis and given 0.6mg Colchicine. Admitted to Cardiology for further work-up and management of pericarditis. Currently stable. Awaiting diagnostics CTA-cors and cardiac MRI.

## 2020-01-06 NOTE — PROGRESS NOTE ADULT - ATTENDING COMMENTS
24F w/ pmh of ADHD(on Vyvanse), also w/ recent Strep Throat infection 1week PTA, p/w chest pain, ST elevations and elevated Cardiac enzymes -> pt. admitted to cardiac Telemetry for likely myopericarditis     -CVS - active strep throat infection w/ new-onset CP and GABI on EKG -> low suspicion for ACS vs highly likely myopericarditis. Troponins initially downtrended, but if since continued to rise. There has been no clinical worsening a/w rising troponins; CP has improved, pt. remains HD and electrically stable and EKG changes have also improved; Initial TTE on 1/4 -showed Normal EF, no WMA, no sig valvular disease and no pericardial effusion; Plan for CCTA and cMR for today; c/w Lopressor 25 BID  -ID - Step throat -> currently improving and pt. remains afebrile -> c/w Amoxicillin 875 PO BID; RVP, Flu, HIV and Blood Cx's are all negative  -Regular diet  -DVT PPx  -Dispo: Cardiac Tele  -Full Code    Ramona Mendoza MD  Cardiology Attending

## 2020-01-06 NOTE — SBIRT NOTE ADULT - NSSBIRTBRIEFINTDET_GEN_A_CORE
Patient reported drinking 7-9 drinks on the weekends. Patient denies ETOH misuse.  educated patient on alcohol use.  offered patient resources; patient declined.

## 2020-01-07 VITALS — TEMPERATURE: 99 F

## 2020-01-07 LAB
ANA TITR SER: NEGATIVE — SIGNIFICANT CHANGE UP
CK MB CFR SERPL CALC: 2.9 NG/ML — SIGNIFICANT CHANGE UP (ref 0–6.7)
CK SERPL-CCNC: 59 U/L — SIGNIFICANT CHANGE UP (ref 25–170)
TROPONIN T SERPL-MCNC: 0.55 NG/ML — CRITICAL HIGH (ref 0–0.01)

## 2020-01-07 PROCEDURE — 86225 DNA ANTIBODY NATIVE: CPT

## 2020-01-07 PROCEDURE — 99285 EMERGENCY DEPT VISIT HI MDM: CPT | Mod: 25

## 2020-01-07 PROCEDURE — 85025 COMPLETE CBC W/AUTO DIFF WBC: CPT

## 2020-01-07 PROCEDURE — 85027 COMPLETE CBC AUTOMATED: CPT

## 2020-01-07 PROCEDURE — 86038 ANTINUCLEAR ANTIBODIES: CPT

## 2020-01-07 PROCEDURE — 71046 X-RAY EXAM CHEST 2 VIEWS: CPT

## 2020-01-07 PROCEDURE — 84443 ASSAY THYROID STIM HORMONE: CPT

## 2020-01-07 PROCEDURE — 80048 BASIC METABOLIC PNL TOTAL CA: CPT

## 2020-01-07 PROCEDURE — 80053 COMPREHEN METABOLIC PANEL: CPT

## 2020-01-07 PROCEDURE — 85652 RBC SED RATE AUTOMATED: CPT

## 2020-01-07 PROCEDURE — 96374 THER/PROPH/DIAG INJ IV PUSH: CPT | Mod: XU

## 2020-01-07 PROCEDURE — 93005 ELECTROCARDIOGRAM TRACING: CPT

## 2020-01-07 PROCEDURE — 71275 CT ANGIOGRAPHY CHEST: CPT

## 2020-01-07 PROCEDURE — A9577: CPT

## 2020-01-07 PROCEDURE — 85379 FIBRIN DEGRADATION QUANT: CPT

## 2020-01-07 PROCEDURE — 75574 CT ANGIO HRT W/3D IMAGE: CPT

## 2020-01-07 PROCEDURE — 80061 LIPID PANEL: CPT

## 2020-01-07 PROCEDURE — 82550 ASSAY OF CK (CPK): CPT

## 2020-01-07 PROCEDURE — 75561 CARDIAC MRI FOR MORPH W/DYE: CPT

## 2020-01-07 PROCEDURE — 82553 CREATINE MB FRACTION: CPT

## 2020-01-07 PROCEDURE — 87040 BLOOD CULTURE FOR BACTERIA: CPT

## 2020-01-07 PROCEDURE — 87389 HIV-1 AG W/HIV-1&-2 AB AG IA: CPT

## 2020-01-07 PROCEDURE — 86140 C-REACTIVE PROTEIN: CPT

## 2020-01-07 PROCEDURE — 83036 HEMOGLOBIN GLYCOSYLATED A1C: CPT

## 2020-01-07 PROCEDURE — 36415 COLL VENOUS BLD VENIPUNCTURE: CPT

## 2020-01-07 PROCEDURE — 84484 ASSAY OF TROPONIN QUANT: CPT

## 2020-01-07 PROCEDURE — 94640 AIRWAY INHALATION TREATMENT: CPT

## 2020-01-07 PROCEDURE — 99239 HOSP IP/OBS DSCHRG MGMT >30: CPT

## 2020-01-07 PROCEDURE — 80307 DRUG TEST PRSMV CHEM ANLYZR: CPT

## 2020-01-07 PROCEDURE — 93306 TTE W/DOPPLER COMPLETE: CPT

## 2020-01-07 PROCEDURE — 87631 RESP VIRUS 3-5 TARGETS: CPT

## 2020-01-07 RX ORDER — COLCHICINE 0.6 MG
1 TABLET ORAL
Qty: 120 | Refills: 0
Start: 2020-01-07 | End: 2020-03-06

## 2020-01-07 RX ORDER — LISDEXAMFETAMINE DIMESYLATE 70 MG/1
1 CAPSULE ORAL
Qty: 0 | Refills: 0 | DISCHARGE

## 2020-01-07 RX ORDER — METOPROLOL TARTRATE 50 MG
12.5 TABLET ORAL EVERY 12 HOURS
Refills: 0 | Status: DISCONTINUED | OUTPATIENT
Start: 2020-01-07 | End: 2020-01-07

## 2020-01-07 RX ORDER — PANTOPRAZOLE SODIUM 20 MG/1
1 TABLET, DELAYED RELEASE ORAL
Qty: 3 | Refills: 0
Start: 2020-01-07 | End: 2020-01-09

## 2020-01-07 RX ORDER — AMOXICILLIN 250 MG/5ML
1 SUSPENSION, RECONSTITUTED, ORAL (ML) ORAL
Qty: 0 | Refills: 0 | DISCHARGE

## 2020-01-07 RX ORDER — IBUPROFEN 200 MG
1 TABLET ORAL
Qty: 9 | Refills: 0
Start: 2020-01-07 | End: 2020-01-09

## 2020-01-07 RX ADMIN — PANTOPRAZOLE SODIUM 40 MILLIGRAM(S): 20 TABLET, DELAYED RELEASE ORAL at 05:38

## 2020-01-07 RX ADMIN — Medication 0.6 MILLIGRAM(S): at 05:38

## 2020-01-07 RX ADMIN — Medication 600 MILLIGRAM(S): at 06:53

## 2020-01-07 RX ADMIN — Medication 600 MILLIGRAM(S): at 05:38

## 2020-01-07 RX ADMIN — Medication 25 MILLIGRAM(S): at 05:38

## 2020-01-07 RX ADMIN — Medication 875 MILLIGRAM(S): at 05:38

## 2020-01-07 NOTE — DISCHARGE NOTE NURSING/CASE MANAGEMENT/SOCIAL WORK - PATIENT PORTAL LINK FT
You can access the FollowMyHealth Patient Portal offered by [facility] by registering at the following website: [email or web address]. By joining [facility]’s FollowMyHealth portal, you will also be able to view your health information using other applications (apps) compatible with our system.

## 2020-01-07 NOTE — DISCHARGE NOTE NURSING/CASE MANAGEMENT/SOCIAL WORK - NSDCFUADDAPPT_GEN_ALL_CORE_FT
1. Follow up with your cardiologist Dr Javed in 2 weeks. Please call the office to make an appointment as the office was closed today.

## 2020-01-07 NOTE — DISCHARGE NOTE NURSING/CASE MANAGEMENT/SOCIAL WORK - NSDCPETBCESMAN_GEN_ALL_CORE
RECEIVING UNIT ED HANDOFF REVIEW    ED Nurse Handoff Report was reviewed by: Marsha Zelaya on August 21, 2018 at 5:32 PM          If you are a smoker, it is important for your health to stop smoking. Please be aware that second hand smoke is also harmful.

## 2020-01-09 LAB
CULTURE RESULTS: SIGNIFICANT CHANGE UP
CULTURE RESULTS: SIGNIFICANT CHANGE UP
SPECIMEN SOURCE: SIGNIFICANT CHANGE UP
SPECIMEN SOURCE: SIGNIFICANT CHANGE UP

## 2020-01-15 DIAGNOSIS — B95.5 UNSPECIFIED STREPTOCOCCUS AS THE CAUSE OF DISEASES CLASSIFIED ELSEWHERE: ICD-10-CM

## 2020-01-15 DIAGNOSIS — J02.0 STREPTOCOCCAL PHARYNGITIS: ICD-10-CM

## 2020-01-15 DIAGNOSIS — I30.9 ACUTE PERICARDITIS, UNSPECIFIED: ICD-10-CM

## 2020-01-15 DIAGNOSIS — F90.9 ATTENTION-DEFICIT HYPERACTIVITY DISORDER, UNSPECIFIED TYPE: ICD-10-CM

## 2020-01-15 DIAGNOSIS — I40.0 INFECTIVE MYOCARDITIS: ICD-10-CM

## 2020-01-15 DIAGNOSIS — R79.89 OTHER SPECIFIED ABNORMAL FINDINGS OF BLOOD CHEMISTRY: ICD-10-CM

## 2023-07-05 NOTE — PATIENT PROFILE ADULT - TOBACCO USE
[Change in Activity] : change in activity [Joint Pains] : arthralgias [Appropriate Age Development] : development appropriate for age [Immunizations are up to date] : Immunizations are up to date [Fever Above 102] : no fever [Itching] : no itching [Redness] : no redness [Wheezing] : no wheezing [Asthma] : no asthma [Bladder Infection] : denies bladder infection [Limping] : no limping [Joint Swelling] : no joint swelling Never smoker